# Patient Record
Sex: FEMALE
[De-identification: names, ages, dates, MRNs, and addresses within clinical notes are randomized per-mention and may not be internally consistent; named-entity substitution may affect disease eponyms.]

---

## 2022-09-28 ENCOUNTER — NURSE TRIAGE (OUTPATIENT)
Dept: OTHER | Facility: CLINIC | Age: 37
End: 2022-09-28

## 2022-09-28 NOTE — TELEPHONE ENCOUNTER
Subjective: Caller states \"I bit my tongue\"     Current Symptoms: bit her tongue while eating and the small bump is a little swollen. Doesn't impeded her ability to talk or eat. Mild discomfort. Wants advice on what to do for discomfort. Onset: 4 hours ago; sudden    Associated Symptoms: NA    Pain Severity: ; constant, mild    Temperature: none indicated     What has been tried: nothing    LMP:  not given  Pregnant: Unknown    Recommended disposition: Home Care    Care advice provided, patient verbalizes understanding; denies any other questions or concerns; instructed to call back for any new or worsening symptoms. Follow home care advice    This triage is a result of a call to [address] [place] [address] of [place] [address]. Please do not respond to the triage nurse through this encounter. Any subsequent communication should be directly with the patient.     Reason for Disposition   Followed a tongue injury   Tongue: bruise or small cut    Protocols used: Tongue Swelling-ADULT-, Mouth Injury-ADULT-

## 2024-05-15 ENCOUNTER — TELEPHONE (OUTPATIENT)
Dept: OBSTETRICS AND GYNECOLOGY | Facility: CLINIC | Age: 39
End: 2024-05-15
Payer: COMMERCIAL

## 2024-12-05 ENCOUNTER — APPOINTMENT (OUTPATIENT)
Dept: DERMATOLOGY | Facility: CLINIC | Age: 39
End: 2024-12-05
Payer: COMMERCIAL

## 2025-06-27 ENCOUNTER — APPOINTMENT (OUTPATIENT)
Dept: OBSTETRICS AND GYNECOLOGY | Facility: CLINIC | Age: 40
End: 2025-06-27
Payer: COMMERCIAL